# Patient Record
Sex: MALE | Race: WHITE | URBAN - METROPOLITAN AREA
[De-identification: names, ages, dates, MRNs, and addresses within clinical notes are randomized per-mention and may not be internally consistent; named-entity substitution may affect disease eponyms.]

---

## 2023-06-27 ENCOUNTER — WEB ENCOUNTER (OUTPATIENT)
Dept: URBAN - METROPOLITAN AREA CLINIC 113 | Facility: CLINIC | Age: 68
End: 2023-06-27

## 2023-06-27 ENCOUNTER — OFFICE VISIT (OUTPATIENT)
Dept: URBAN - METROPOLITAN AREA CLINIC 113 | Facility: CLINIC | Age: 68
End: 2023-06-27
Payer: MEDICARE

## 2023-06-27 VITALS
TEMPERATURE: 97.3 F | WEIGHT: 170.2 LBS | DIASTOLIC BLOOD PRESSURE: 67 MMHG | HEART RATE: 59 BPM | SYSTOLIC BLOOD PRESSURE: 158 MMHG | BODY MASS INDEX: 22.56 KG/M2 | HEIGHT: 73 IN | RESPIRATION RATE: 18 BRPM

## 2023-06-27 DIAGNOSIS — Z87.438 HISTORY OF BPH: ICD-10-CM

## 2023-06-27 DIAGNOSIS — Z12.11 COLON CANCER SCREENING: ICD-10-CM

## 2023-06-27 PROCEDURE — 99243 OFF/OP CNSLTJ NEW/EST LOW 30: CPT | Performed by: INTERNAL MEDICINE

## 2023-06-27 PROCEDURE — 99203 OFFICE O/P NEW LOW 30 MIN: CPT | Performed by: INTERNAL MEDICINE

## 2023-06-27 RX ORDER — POLYETHYLENE GLYCOL 3350 AND ELECTROLYTES WITH LEMON FLAVOR 236; 22.74; 6.74; 5.86; 2.97 G/4L; G/4L; G/4L; G/4L; G/4L
AS DIRECTED POWDER, FOR SOLUTION ORAL
Qty: 4000 GM | Refills: 0 | OUTPATIENT
Start: 2023-06-27 | End: 2023-06-28

## 2023-06-27 NOTE — PHYSICAL EXAM EYES:
Conjuntivae and eyelids appear normal, Sclerae : White without injection gait, locomotion, and balance

## 2023-06-27 NOTE — HPI-TODAY'S VISIT:
67 y/o male referred by Dr. Maggie Figueroa for colon cancer screening. He has had a colonsopy long ago but has not had any colon polyps. He is unsure when he last had it done but says he is overdue. He denies any family hx of colon cancer or bleeding. Patient is without any abdominal complaints. No dysphagia, heartburn, regurgitation, unintentional weight loss, nausea, vomiting, hematemesis or melena. Bowels are moving regularly without blood per rectum. No complaints of bloating. No jaundice, icterus.
99

## 2023-07-18 ENCOUNTER — TELEPHONE ENCOUNTER (OUTPATIENT)
Dept: URBAN - METROPOLITAN AREA CLINIC 113 | Facility: CLINIC | Age: 68
End: 2023-07-18

## 2023-09-06 ENCOUNTER — OUT OF OFFICE VISIT (OUTPATIENT)
Dept: URBAN - METROPOLITAN AREA SURGERY CENTER 25 | Facility: SURGERY CENTER | Age: 68
End: 2023-09-06
Payer: MEDICARE

## 2023-09-06 ENCOUNTER — WEB ENCOUNTER (OUTPATIENT)
Dept: URBAN - METROPOLITAN AREA SURGERY CENTER 25 | Facility: SURGERY CENTER | Age: 68
End: 2023-09-06

## 2023-09-06 DIAGNOSIS — K64.1 SECOND DEGREE HEMORRHOIDS: ICD-10-CM

## 2023-09-06 DIAGNOSIS — Z12.11 COLON CANCER SCREENING (HIGH RISK): ICD-10-CM

## 2023-09-06 DIAGNOSIS — Z12.11 COLON CANCER SCREENING: ICD-10-CM

## 2023-09-06 PROCEDURE — G0121 COLON CA SCRN NOT HI RSK IND: HCPCS | Performed by: INTERNAL MEDICINE

## 2023-09-06 PROCEDURE — G8907 PT DOC NO EVENTS ON DISCHARG: HCPCS | Performed by: INTERNAL MEDICINE

## 2023-09-06 PROCEDURE — 00811 ANES LWR INTST NDSC NOS: CPT | Performed by: ANESTHESIOLOGY

## 2023-09-20 ENCOUNTER — OFFICE VISIT (OUTPATIENT)
Dept: URBAN - METROPOLITAN AREA CLINIC 113 | Facility: CLINIC | Age: 68
End: 2023-09-20
Payer: MEDICARE

## 2023-09-20 VITALS
WEIGHT: 167.6 LBS | TEMPERATURE: 97.5 F | HEART RATE: 62 BPM | SYSTOLIC BLOOD PRESSURE: 139 MMHG | DIASTOLIC BLOOD PRESSURE: 62 MMHG | BODY MASS INDEX: 22.21 KG/M2 | HEIGHT: 73 IN | RESPIRATION RATE: 20 BRPM

## 2023-09-20 DIAGNOSIS — R31.9 HEMATURIA, UNSPECIFIED TYPE: ICD-10-CM

## 2023-09-20 DIAGNOSIS — R63.4 WEIGHT LOSS: ICD-10-CM

## 2023-09-20 DIAGNOSIS — R10.84 GENERALIZED ABDOMINAL PAIN: ICD-10-CM

## 2023-09-20 DIAGNOSIS — K59.09 CHANGE IN BOWEL MOVEMENTS INTERMITTENT CONSTIPATION. URGENCY IN THE MORNING.: ICD-10-CM

## 2023-09-20 PROBLEM — 14760008: Status: ACTIVE | Noted: 2023-09-20

## 2023-09-20 PROCEDURE — 99214 OFFICE O/P EST MOD 30 MIN: CPT

## 2023-09-20 RX ORDER — TAMSULOSIN HYDROCHLORIDE 0.4 MG/1
1 CAPSULE CAPSULE ORAL ONCE A DAY
Status: ACTIVE | COMMUNITY

## 2023-09-20 NOTE — HPI-TODAY'S VISIT:
68-year-old male presents for follow-up after screening colonoscopy.  Colonoscopy 9/6/2023:Performed without difficulty.  BBPS score of 9 using Suprep.  Grade 2 nonbleeding internal hemorrhoids.  No specimens collected.  Repeat colonoscopy in 10 years for screening purposes.  In June, He thought he had a UTI. Urinalysis was slightly abnormal, so he was placed on oral antibiotics. Symptoms did not improve. He was placed on a second course of antibiotics then referred to urology. He was then placed on a third round of oral antibiotics.  He just completed this round. He has been suffering from constipation during all of these symptoms. He was straining to defecate. He has been eating a high fiber cereal in the morning and taking Citrucel daily. This seemed to help for a while. He is planned for a cystoscopy. Renal US was normal.   He no longer has burning with urination. He was eventually started on Flomax due to BPH symptoms. His main complaint now is persistent fatigue, mild unintentional weight loss. He has diabetes which has been controlled with diet. He eats fairly healthy. He has had testicular cancer in 1995. He underwent chemotherapy.

## 2023-09-21 LAB
A/G RATIO: 1.7
ABSOLUTE BASOPHILS: 51
ABSOLUTE EOSINOPHILS: 138
ABSOLUTE LYMPHOCYTES: 1150
ABSOLUTE MONOCYTES: 396
ABSOLUTE NEUTROPHILS: 2866
ALBUMIN: 4.4
ALKALINE PHOSPHATASE: 80
ALT (SGPT): 49
AST (SGOT): 20
BASOPHILS: 1.1
BILIRUBIN, TOTAL: 0.6
BUN/CREATININE RATIO: 18
BUN: 24
C-REACTIVE PROTEIN, QUANT: 4.6
CALCIUM: 9.8
CARBON DIOXIDE, TOTAL: 29
CHLORIDE: 105
CREATININE: 1.36
EGFR: 57
EOSINOPHILS: 3
GLOBULIN, TOTAL: 2.6
GLUCOSE: 105
HEMATOCRIT: 39.5
HEMOGLOBIN: 13.5
LYMPHOCYTES: 25
MCH: 30.9
MCHC: 34.2
MCV: 90.4
MONOCYTES: 8.6
MPV: 10.7
NEUTROPHILS: 62.3
PLATELET COUNT: 216
POTASSIUM: 4.6
PROTEIN, TOTAL: 7
RDW: 12.5
RED BLOOD CELL COUNT: 4.37
SED RATE BY MODIFIED: 11
SODIUM: 141
TSH W/REFLEX TO FT4: 2.66
WHITE BLOOD CELL COUNT: 4.6

## 2023-09-26 ENCOUNTER — WEB ENCOUNTER (OUTPATIENT)
Dept: URBAN - METROPOLITAN AREA CLINIC 113 | Facility: CLINIC | Age: 68
End: 2023-09-26

## 2023-10-13 ENCOUNTER — TELEPHONE ENCOUNTER (OUTPATIENT)
Dept: URBAN - METROPOLITAN AREA CLINIC 113 | Facility: CLINIC | Age: 68
End: 2023-10-13

## 2023-11-03 ENCOUNTER — WEB ENCOUNTER (OUTPATIENT)
Dept: URBAN - METROPOLITAN AREA CLINIC 113 | Facility: CLINIC | Age: 68
End: 2023-11-03

## 2023-11-20 ENCOUNTER — OFFICE VISIT (OUTPATIENT)
Dept: URBAN - METROPOLITAN AREA CLINIC 113 | Facility: CLINIC | Age: 68
End: 2023-11-20
Payer: MEDICARE

## 2023-11-20 ENCOUNTER — DASHBOARD ENCOUNTERS (OUTPATIENT)
Age: 68
End: 2023-11-20

## 2023-11-20 VITALS
TEMPERATURE: 97.3 F | WEIGHT: 172.4 LBS | HEART RATE: 56 BPM | RESPIRATION RATE: 18 BRPM | HEIGHT: 73 IN | BODY MASS INDEX: 22.85 KG/M2 | SYSTOLIC BLOOD PRESSURE: 148 MMHG | DIASTOLIC BLOOD PRESSURE: 60 MMHG

## 2023-11-20 DIAGNOSIS — R74.8 ELEVATED LIVER ENZYMES: ICD-10-CM

## 2023-11-20 DIAGNOSIS — R63.4 WEIGHT LOSS: ICD-10-CM

## 2023-11-20 DIAGNOSIS — K59.01 CONSTIPATION: ICD-10-CM

## 2023-11-20 DIAGNOSIS — R10.84 GENERALIZED ABDOMINAL PAIN: ICD-10-CM

## 2023-11-20 PROCEDURE — 99214 OFFICE O/P EST MOD 30 MIN: CPT

## 2023-11-20 RX ORDER — TAMSULOSIN HYDROCHLORIDE 0.4 MG/1
1 CAPSULE CAPSULE ORAL ONCE A DAY
Status: ACTIVE | COMMUNITY

## 2023-11-20 RX ORDER — SULFAMETHOXAZOLE AND TRIMETHOPRIM 800; 160 MG/1; MG/1
1 TABLET TABLET ORAL
Status: ACTIVE | COMMUNITY

## 2023-11-20 RX ORDER — DICYCLOMINE HYDROCHLORIDE 10 MG/1
1 CAPSULE CAPSULE ORAL
Qty: 90 | Refills: 1 | OUTPATIENT
Start: 2023-11-20 | End: 2024-01-19

## 2023-11-20 NOTE — HPI-TODAY'S VISIT:
68-year-old male presents for follow-up.  He was last seen on 9/20/2023.  He is planned for labs and CT imaging.  Labs 9/20/2023: Normal sed rate, normal CRP, normal TSH, creatinine 1.36, GFR 57, ALT 49, normal AST, normal alk phos, normal total bilirubin, normal CBC.  He was informed labs are normal with exception of decreased kidney function which he should address with his PCP.  He did have mildly elevated ALT which could simply be reactive.  CT scan of the abdomen/pelvis with contrast 11/2/2023: Cholelithiasis with a decompressed gallbladder.  Normal pancreas, spleen, adrenal glands.  Moderate amount of fecal material in the colon.  He performed a cystoscopy. His bladder was normal however his urethra was narrowed. He has had marked improvement in his ability to urinate since then. He was told he was not emptying his bladder completely in the past which would lead to recurrent UTIs. He is currently on Bactrim.   His fatigue has somewhat improved. He continues to have vague lower abdominal discomfort. Today he has a more focalizaed pain in his RLQ. Denies RUQ/epigastric pain or n/v. He is concerned about his gallbladder.  9/20/2023:  68-year-old male presents for follow-up after screening colonoscopy.  Colonoscopy 9/6/2023:Performed without difficulty.  BBPS score of 9 using Suprep.  Grade 2 nonbleeding internal hemorrhoids.  No specimens collected.  Repeat colonoscopy in 10 years for screening purposes.  In June, He thought he had a UTI. Urinalysis was slightly abnormal, so he was placed on oral antibiotics. Symptoms did not improve. He was placed on a second course of antibiotics then referred to urology. He was then placed on a third round of oral antibiotics.  He just completed this round. He has been suffering from constipation during all of these symptoms. He was straining to defecate. He has been eating a high fiber cereal in the morning and taking Citrucel daily. This seemed to help for a while. He is planned for a cystoscopy. Renal US was normal.   He no longer has burning with urination. He was eventually started on Flomax due to BPH symptoms. His main complaint now is persistent fatigue, mild unintentional weight loss. He has diabetes which has been controlled with diet. He eats fairly healthy. He has had testicular cancer in 1995. He underwent chemotherapy.

## 2023-11-21 LAB
ALBUMIN/GLOBULIN RATIO: 1.8
ALBUMIN: 4.7
ALKALINE PHOSPHATASE: 63
ALT (SGPT): 8
AST (SGOT): 16
BILIRUBIN, DIRECT: 0.1
BILIRUBIN, INDIRECT: 0.3
BILIRUBIN, TOTAL: 0.4
GLOBULIN: 2.6
PROTEIN, TOTAL: 7.3

## 2024-02-20 ENCOUNTER — OV EP (OUTPATIENT)
Dept: URBAN - METROPOLITAN AREA CLINIC 113 | Facility: CLINIC | Age: 69
End: 2024-02-20